# Patient Record
Sex: MALE | Race: WHITE | NOT HISPANIC OR LATINO | URBAN - METROPOLITAN AREA
[De-identification: names, ages, dates, MRNs, and addresses within clinical notes are randomized per-mention and may not be internally consistent; named-entity substitution may affect disease eponyms.]

---

## 2018-12-17 ENCOUNTER — TELEPHONE (OUTPATIENT)
Dept: UROLOGY | Facility: AMBULATORY SURGERY CENTER | Age: 83
End: 2018-12-17

## 2018-12-18 ENCOUNTER — OFFICE VISIT (OUTPATIENT)
Dept: UROLOGY | Facility: MEDICAL CENTER | Age: 83
End: 2018-12-18
Payer: MEDICARE

## 2018-12-18 VITALS
HEIGHT: 67 IN | BODY MASS INDEX: 24.48 KG/M2 | WEIGHT: 156 LBS | SYSTOLIC BLOOD PRESSURE: 102 MMHG | DIASTOLIC BLOOD PRESSURE: 60 MMHG

## 2018-12-18 DIAGNOSIS — N40.1 BENIGN PROSTATIC HYPERPLASIA WITH URINARY OBSTRUCTION: ICD-10-CM

## 2018-12-18 DIAGNOSIS — N13.8 BENIGN PROSTATIC HYPERPLASIA WITH URINARY OBSTRUCTION: ICD-10-CM

## 2018-12-18 DIAGNOSIS — R30.0 DYSURIA: Primary | ICD-10-CM

## 2018-12-18 PROBLEM — IMO0002: Status: ACTIVE | Noted: 2017-02-17

## 2018-12-18 PROBLEM — M17.0 PRIMARY OSTEOARTHRITIS OF BOTH KNEES: Status: ACTIVE | Noted: 2018-11-12

## 2018-12-18 PROBLEM — Z95.810 BIVENTRICULAR ICD (IMPLANTABLE CARDIOVERTER-DEFIBRILLATOR) IN PLACE: Status: ACTIVE | Noted: 2018-11-06

## 2018-12-18 PROBLEM — B37.0 THRUSH: Status: ACTIVE | Noted: 2018-02-20

## 2018-12-18 PROBLEM — K21.9 GASTROESOPHAGEAL REFLUX DISEASE WITHOUT ESOPHAGITIS: Status: ACTIVE | Noted: 2017-02-17

## 2018-12-18 LAB
POST-VOID RESIDUAL VOLUME, ML POC: 20 ML
SL AMB  POCT GLUCOSE, UA: ABNORMAL
SL AMB LEUKOCYTE ESTERASE,UA: ABNORMAL
SL AMB POCT BILIRUBIN,UA: ABNORMAL
SL AMB POCT BLOOD,UA: ABNORMAL
SL AMB POCT CLARITY,UA: ABNORMAL
SL AMB POCT COLOR,UA: ABNORMAL
SL AMB POCT KETONES,UA: ABNORMAL
SL AMB POCT NITRITE,UA: POSITIVE
SL AMB POCT PH,UA: 5.5
SL AMB POCT SPECIFIC GRAVITY,UA: 1.02
SL AMB POCT URINE PROTEIN: 100
SL AMB POCT UROBILINOGEN: 0.2

## 2018-12-18 PROCEDURE — 87086 URINE CULTURE/COLONY COUNT: CPT | Performed by: UROLOGY

## 2018-12-18 PROCEDURE — 81003 URINALYSIS AUTO W/O SCOPE: CPT | Performed by: UROLOGY

## 2018-12-18 PROCEDURE — 87077 CULTURE AEROBIC IDENTIFY: CPT | Performed by: UROLOGY

## 2018-12-18 PROCEDURE — 99214 OFFICE O/P EST MOD 30 MIN: CPT | Performed by: UROLOGY

## 2018-12-18 PROCEDURE — 87186 SC STD MICRODIL/AGAR DIL: CPT | Performed by: UROLOGY

## 2018-12-18 PROCEDURE — 51798 US URINE CAPACITY MEASURE: CPT | Performed by: UROLOGY

## 2018-12-18 RX ORDER — CIPROFLOXACIN 500 MG/1
500 TABLET, FILM COATED ORAL EVERY 12 HOURS SCHEDULED
Qty: 14 TABLET | Refills: 0 | Status: SHIPPED | OUTPATIENT
Start: 2018-12-18 | End: 2018-12-25

## 2018-12-18 RX ORDER — DIGOXIN 125 MCG
0.12 TABLET ORAL
COMMUNITY
Start: 2018-03-05

## 2018-12-18 RX ORDER — TEMAZEPAM 15 MG/1
15 CAPSULE ORAL DAILY PRN
COMMUNITY
Start: 2018-11-20

## 2018-12-18 RX ORDER — DOFETILIDE 0.25 MG/1
250 CAPSULE ORAL
COMMUNITY
Start: 2018-05-24

## 2018-12-18 RX ORDER — ALBUTEROL SULFATE 90 UG/1
AEROSOL, METERED RESPIRATORY (INHALATION)
COMMUNITY
Start: 2017-08-14

## 2018-12-18 RX ORDER — LISINOPRIL 5 MG/1
TABLET ORAL
COMMUNITY
Start: 2018-07-12

## 2018-12-18 RX ORDER — TORSEMIDE 20 MG/1
10 TABLET ORAL
COMMUNITY
Start: 2018-10-17

## 2018-12-18 RX ORDER — UBIDECARENONE 100 MG
100 CAPSULE ORAL
COMMUNITY

## 2018-12-18 RX ORDER — COD LIVER OIL
1 OIL (ML) ORAL
COMMUNITY

## 2018-12-18 RX ORDER — LANCETS 23 GAUGE
EACH MISCELLANEOUS
COMMUNITY
Start: 2017-03-15

## 2018-12-18 RX ORDER — ALLOPURINOL 100 MG/1
200 TABLET ORAL
COMMUNITY
Start: 2018-09-04 | End: 2019-09-04

## 2018-12-18 RX ORDER — ALBUTEROL SULFATE 2.5 MG/3ML
2.5 SOLUTION RESPIRATORY (INHALATION)
COMMUNITY
Start: 2017-12-12

## 2018-12-18 RX ORDER — BISOPROLOL FUMARATE 5 MG/1
5 TABLET ORAL
COMMUNITY
Start: 2018-04-11

## 2018-12-18 NOTE — ASSESSMENT & PLAN NOTE
His exam is benign  The recent exacerbation of symptoms is likely due to urinary tract infection  We will reassess his voiding pattern after treatment of his urinary infection  He will return in 6 weeks

## 2018-12-18 NOTE — ASSESSMENT & PLAN NOTE
Urinalysis is consistent with urinary tract infection  We will send urine for culture and treat empirically with Cipro 500 m tablet p o  B i d  X1 week  Postvoid residual is acceptable at 20 cc

## 2018-12-18 NOTE — PROGRESS NOTES
Assessment/Plan:    Dysuria  Urinalysis is consistent with urinary tract infection  We will send urine for culture and treat empirically with Cipro 500 m tablet p o  B i d  X1 week  Postvoid residual is acceptable at 20 cc  Benign prostatic hyperplasia with urinary obstruction  His exam is benign  The recent exacerbation of symptoms is likely due to urinary tract infection  We will reassess his voiding pattern after treatment of his urinary infection  He will return in 6 weeks  Diagnoses and all orders for this visit:    Dysuria  -     POCT urine dip auto non-scope  -     Urine culture  -     ciprofloxacin (CIPRO) 500 mg tablet; Take 1 tablet (500 mg total) by mouth every 12 (twelve) hours for 7 days  -     POCT Measure PVR    Benign prostatic hyperplasia with urinary obstruction    Other orders  -     albuterol (2 5 mg/3 mL) 0 083 % nebulizer solution; Inhale 2 5 mg  -     allopurinol (ZYLOPRIM) 100 mg tablet; Take 200 mg by mouth  -     apixaban (ELIQUIS) 2 5 mg; Take 2 5 mg by mouth  -     bisoprolol (ZEBETA) 5 mg tablet; Take 5 mg by mouth  -     Cholecalciferol 41337 units TABS; Take by mouth  -     Cod Liver Oil 5000-500 UNIT/5ML OIL; Take 1 tablet by mouth  -     co-enzyme Q-10 100 mg capsule; Take 100 mg by mouth  -     digoxin (LANOXIN) 0 125 mg tablet; Take 0 125 mg by mouth  -     dofetilide (TIKOSYN) 250 mcg capsule; Take 250 mcg by mouth  -     fluticasone-salmeterol (ADVAIR DISKUS) 250-50 mcg/dose inhaler; Inhale 1 puff  -     Lancets 28G MISC; USE TO TEST BLOOD GLUCOSE THREE TIMES DAILY  -     glucose blood test strip; Test blood sugars three times daily  DX:D08 65, E08 22  -     lisinopril (ZESTRIL) 5 mg tablet; TAKE ONE-HALF TABLET BY MOUTH EVERY DAY  -     temazepam (RESTORIL) 15 mg capsule; Take 15 mg by mouth daily as needed  -     torsemide (DEMADEX) 20 mg tablet;  Take 10 mg by mouth    -     albuterol (VENTOLIN HFA) 90 mcg/act inhaler; USE 1 PUFF BY MOUTH EVERY 6 HOUR AS NEEDED FOR WHEEZING        AUA SYMPTOM SCORE      Most Recent Value   AUA SYMPTOM SCORE   How often have you had a sensation of not emptying your bladder completely after you finished urinating? 5   How often have you had to urinate again less than two hours after you finished urinating? 5   How often have you found you stopped and started again several times when you urinate? 5   How often have you found it difficult to postpone urination? 0   How often have you had a weak urinary stream?  0   How often have you had to push or strain to begin urination? 0   How many times did you most typically get up to urinate from the time you went to bed at night until the time you got up in the morning? 5   Quality of Life: If you were to spend the rest of your life with your urinary condition just the way it is now, how would you feel about that?  6   AUA SYMPTOM SCORE  20        Subjective:      Patient ID: Glynn Pereira is a 80 y o  male  CC:  Dysuria and urinary frequency    HPI:  19-year-old male with multiple medical problems who notes a long history of lower tract symptoms  He was recently hospitalized and his diuretic was adjusted  He began to note he was voiding more frequently  Approximately 10 days ago he noted the onset of worsening urinary frequency,  and dysuria  He is getting up approximately 5 times at night to urinate  His symptoms have continued he is not happy with his voiding pattern at this time  There is no gross hematuria  He has no flank pain  He denies fever  He is unsure if he is emptying his bladder  The following portions of the patient's history were reviewed and updated as appropriate: allergies, current medications, past family history, past medical history, past social history, past surgical history and problem list     Review of Systems   Constitutional: Negative for chills, diaphoresis, fatigue and fever  HENT: Negative  Eyes: Negative  Respiratory: Negative  Cardiovascular: Negative  Gastrointestinal: Negative  Endocrine: Negative  Genitourinary:        See HPI   Musculoskeletal: Positive for neck pain  Skin: Negative  Allergic/Immunologic: Negative  Neurological: Negative  Hematological: Negative  Psychiatric/Behavioral: Negative  Objective:      /60 (BP Location: Left arm, Patient Position: Sitting)   Ht 5' 7" (1 702 m)   Wt 70 8 kg (156 lb)   BMI 24 43 kg/m²          Physical Exam   Constitutional: He is oriented to person, place, and time  He appears well-developed and well-nourished  HENT:   Head: Normocephalic and atraumatic  Eyes: Conjunctivae are normal    Neck: Neck supple  Cardiovascular: Normal rate  Pulmonary/Chest: Effort normal    Abdominal: Soft  Bowel sounds are normal  He exhibits no distension and no mass  There is no tenderness  There is no rebound, no guarding and no CVA tenderness  Right inguinal hernia   Genitourinary: Rectum normal, testes normal and penis normal  Right testis shows no mass  Left testis shows no mass  No phimosis or hypospadias  Genitourinary Comments: Testes descended and nontender  Prostate moderately enlarged and palpably benign, nontender   Musculoskeletal: He exhibits no edema  No CVA tenderness   Neurological: He is alert and oriented to person, place, and time  Skin: Skin is warm and dry  Psychiatric: He has a normal mood and affect  His behavior is normal  Judgment and thought content normal    Vitals reviewed

## 2018-12-18 NOTE — LETTER
2018     Shorty Hanna MD  [address]  [address]    Patient: Lela Serrato   YOB: 1933   Date of Visit: 2018       Dear Dr Nilsa Rapp:    Thank you for referring Lela Serrato to me for evaluation  Below are my notes for this consultation  If you have questions, please do not hesitate to call me  I look forward to following your patient along with you  Sincerely,        Nadira Reyes MD        CC: No Recipients  Nadira Reyes MD  2018  3:06 PM  Sign at close encounter  Assessment/Plan:    Dysuria  Urinalysis is consistent with urinary tract infection  We will send urine for culture and treat empirically with Cipro 500 m tablet p o  B i d  X1 week  Postvoid residual is acceptable at 20 cc  Benign prostatic hyperplasia with urinary obstruction  His exam is benign  The recent exacerbation of symptoms is likely due to urinary tract infection  We will reassess his voiding pattern after treatment of his urinary infection  He will return in 6 weeks  Diagnoses and all orders for this visit:    Dysuria  -     POCT urine dip auto non-scope  -     Urine culture  -     ciprofloxacin (CIPRO) 500 mg tablet; Take 1 tablet (500 mg total) by mouth every 12 (twelve) hours for 7 days  -     POCT Measure PVR    Benign prostatic hyperplasia with urinary obstruction    Other orders  -     albuterol (2 5 mg/3 mL) 0 083 % nebulizer solution; Inhale 2 5 mg  -     allopurinol (ZYLOPRIM) 100 mg tablet; Take 200 mg by mouth  -     apixaban (ELIQUIS) 2 5 mg; Take 2 5 mg by mouth  -     bisoprolol (ZEBETA) 5 mg tablet; Take 5 mg by mouth  -     Cholecalciferol 00609 units TABS; Take by mouth  -     Cod Liver Oil 5000-500 UNIT/5ML OIL; Take 1 tablet by mouth  -     co-enzyme Q-10 100 mg capsule; Take 100 mg by mouth  -     digoxin (LANOXIN) 0 125 mg tablet; Take 0 125 mg by mouth  -     dofetilide (TIKOSYN) 250 mcg capsule;  Take 250 mcg by mouth  -     fluticasone-salmeterol (ADVAIR DISKUS) 250-50 mcg/dose inhaler; Inhale 1 puff  -     Lancets 28G MISC; USE TO TEST BLOOD GLUCOSE THREE TIMES DAILY  -     glucose blood test strip; Test blood sugars three times daily  DX:D08 65, E08 22  -     lisinopril (ZESTRIL) 5 mg tablet; TAKE ONE-HALF TABLET BY MOUTH EVERY DAY  -     temazepam (RESTORIL) 15 mg capsule; Take 15 mg by mouth daily as needed  -     torsemide (DEMADEX) 20 mg tablet; Take 10 mg by mouth    -     albuterol (VENTOLIN HFA) 90 mcg/act inhaler; USE 1 PUFF BY MOUTH EVERY 6 HOUR AS NEEDED FOR WHEEZING        AUA SYMPTOM SCORE      Most Recent Value   AUA SYMPTOM SCORE   How often have you had a sensation of not emptying your bladder completely after you finished urinating? 5   How often have you had to urinate again less than two hours after you finished urinating? 5   How often have you found you stopped and started again several times when you urinate? 5   How often have you found it difficult to postpone urination? 0   How often have you had a weak urinary stream?  0   How often have you had to push or strain to begin urination? 0   How many times did you most typically get up to urinate from the time you went to bed at night until the time you got up in the morning? 5   Quality of Life: If you were to spend the rest of your life with your urinary condition just the way it is now, how would you feel about that?  6   AUA SYMPTOM SCORE  20        Subjective:      Patient ID: Carla Carrillo is a 80 y o  male  CC:  Dysuria and urinary frequency    HPI:  79-year-old male with multiple medical problems who notes a long history of lower tract symptoms  He was recently hospitalized and his diuretic was adjusted  He began to note he was voiding more frequently  Approximately 10 days ago he noted the onset of worsening urinary frequency,  and dysuria  He is getting up approximately 5 times at night to urinate    His symptoms have continued he is not happy with his voiding pattern at this time  There is no gross hematuria  He has no flank pain  He denies fever  He is unsure if he is emptying his bladder  The following portions of the patient's history were reviewed and updated as appropriate: allergies, current medications, past family history, past medical history, past social history, past surgical history and problem list     Review of Systems   Constitutional: Negative for chills, diaphoresis, fatigue and fever  HENT: Negative  Eyes: Negative  Respiratory: Negative  Cardiovascular: Negative  Gastrointestinal: Negative  Endocrine: Negative  Genitourinary:        See HPI   Musculoskeletal: Positive for neck pain  Skin: Negative  Allergic/Immunologic: Negative  Neurological: Negative  Hematological: Negative  Psychiatric/Behavioral: Negative  Objective:      /60 (BP Location: Left arm, Patient Position: Sitting)   Ht 5' 7" (1 702 m)   Wt 70 8 kg (156 lb)   BMI 24 43 kg/m²           Physical Exam   Constitutional: He is oriented to person, place, and time  He appears well-developed and well-nourished  HENT:   Head: Normocephalic and atraumatic  Eyes: Conjunctivae are normal    Neck: Neck supple  Cardiovascular: Normal rate  Pulmonary/Chest: Effort normal    Abdominal: Soft  Bowel sounds are normal  He exhibits no distension and no mass  There is no tenderness  There is no rebound, no guarding and no CVA tenderness  Right inguinal hernia   Genitourinary: Rectum normal, testes normal and penis normal  Right testis shows no mass  Left testis shows no mass  No phimosis or hypospadias  Genitourinary Comments: Testes descended and nontender  Prostate moderately enlarged and palpably benign, nontender   Musculoskeletal: He exhibits no edema  No CVA tenderness   Neurological: He is alert and oriented to person, place, and time  Skin: Skin is warm and dry     Psychiatric: He has a normal mood and affect  His behavior is normal  Judgment and thought content normal    Vitals reviewed

## 2018-12-18 NOTE — PATIENT INSTRUCTIONS
Ciprofloxacin (By mouth)   Ciprofloxacin ([id number])  Treats infections and plague  This medicine is a quinolone antibiotic  Brand Name(s): Cipro   There may be other brand names for this medicine  When This Medicine Should Not Be Used: This medicine is not right for everyone  Do not use it if you had an allergic reaction to ciprofloxacin or to similar medicines  How to Use This Medicine:   Liquid, Tablet, Long Acting Tablet  · Your doctor will tell you how much medicine to use  Do not use more than directed  Take this medicine at the same time each day  · You may take this medicine with or without food  Do not take this medicine with only a source of calcium, such as milk, yogurt, or juice that contains added calcium  You may have foods or drinks that contain calcium as part of a larger meal   · Swallow the extended-release tablet whole  Do not crush, break, or chew it  · Oral liquid: Shake for at least 15 seconds just before each use  The liquid has small beads floating in it  Do not chew the beads when you drink the liquid  Measure the oral liquid medicine with a marked measuring spoon, oral syringe, or medicine cup  · Tablet: Swallow whole  Do not break, crush, or chew it  · Drink extra fluids so you will urinate more often and help prevent kidney problems  · Take all of the medicine in your prescription to clear up your infection, even if you feel better after the first few doses  · This medicine should come with a Medication Guide  Ask your pharmacist for a copy if you do not have one  · Missed dose: Take a dose as soon as you remember  If it is almost time for your next dose, wait until then and take a regular dose  Do not take extra medicine to make up for a missed dose  · Store the medicine in a closed container at room temperature, away from heat, moisture, and direct light  Throw away any leftover liquid medicine after 14 days    Drugs and Foods to Avoid:   Ask your doctor or pharmacist before using any other medicine, including over-the-counter medicines, vitamins, and herbal products  · Do not use this medicine together with tizanidine  · Some foods and medicines can affect how ciprofloxacin works  Tell your doctor if you are using any of the following:  ¨ Clozapine, cyclosporine, duloxetine, lidocaine, methotrexate, olanzapine, pentoxifylline, phenytoin, probenecid, ropinirole, sildenafil, theophylline  ¨ Antibiotic (including azithromycin, clarithromycin, erythromycin)  ¨ Blood thinner (including warfarin)  ¨ Diabetes medicine (including glimepiride, glyburide)  ¨ Medicine for depression or mental illness  ¨ Medicine for heart rhythm problems (including amiodarone, procainamide, quinidine, sotalol)  ¨ NSAID pain medicine (including aspirin, celecoxib, diclofenac, ibuprofen, naproxen)  ¨ Steroid medicine (including hydrocortisone, methylprednisolone, prednisone)  · Take ciprofloxacin at least 2 hours before or 6 hours after you take antacids containing aluminum or magnesium, calcium, zinc, iron, lanthanum, sevelamer, sucralfate, and didanosine  This includes vitamin/mineral supplements  · This medicine slows the digestion of caffeine, so it might affect you for longer than normal   Warnings While Using This Medicine:   · Tell your doctor if you are pregnant or breastfeeding, or if you have kidney disease, liver disease, diabetes, heart disease, myasthenia gravis, or a history of heart rhythm problems (such as prolonged QT interval) or seizures  Tell your doctor if you have ever had tendon or joint problems, including rheumatoid arthritis, or if you have received a transplant  · This medicine may cause the following problems:  ¨ Tendinitis and tendon rupture (may happen after treatment ends)  ¨ Liver damage  ¨ Nerve damage in the arms or legs  ¨ Heart rhythm changes  ¨ Changes in blood sugar levels  · This medicine may make you dizzy, drowsy, or lightheaded   Do not drive or do anything that could be dangerous until you know how this medicine affects you  · This medicine can cause diarrhea  Call your doctor if the diarrhea becomes severe, does not stop, or is bloody  Do not take any medicine to stop diarrhea until you have talked to your doctor  Diarrhea can occur 2 months or more after you stop taking this medicine  · This medicine may make your skin more sensitive to sunlight  Wear sunscreen  Do not use sunlamps or tanning beds  · Call your doctor if your symptoms do not improve or if they get worse  · Keep all medicine out of the reach of children  Never share your medicine with anyone  Possible Side Effects While Using This Medicine:   Call your doctor right away if you notice any of these side effects:  · Allergic reaction: Itching or hives, swelling in your face or hands, swelling or tingling in your mouth or throat, chest tightness, trouble breathing  · Blistering, peeling, red skin rash  · Dark-colored urine or pale stools, nausea, vomiting, loss of appetite, pain in your upper stomach, yellow skin or eyes  · Diarrhea that may contain blood  · Fainting, dizziness, or lightheadedness  · Fast, slow, or uneven heartbeat  · Numbness, tingling, weakness, or burning pain in your hands, arms, legs, or feet  · Pain, stiffness, swelling, or bruises around your ankle, leg, shoulder, or other joint  · Seizures, severe headache, unusual thoughts or behaviors, trouble sleeping, feeling anxious, confused, or depressed, seeing, hearing, or feeling things that are not there  · Unusual bleeding, bruising, or weakness  If you notice other side effects that you think are caused by this medicine, tell your doctor  Call your doctor for medical advice about side effects  You may report side effects to FDA at 1-596-FDA-5340  © 2017 [employer] Information is for End User's use only and may not be sold, redistributed or otherwise used for commercial purposes    The above information is an  only  It is not intended as medical advice for individual conditions or treatments  Talk to your doctor, nurse or pharmacist before following any medical regimen to see if it is safe and effective for you

## 2018-12-20 LAB — BACTERIA UR CULT: ABNORMAL

## 2018-12-26 ENCOUNTER — TELEPHONE (OUTPATIENT)
Dept: UROLOGY | Facility: MEDICAL CENTER | Age: 83
End: 2018-12-26

## 2018-12-26 NOTE — TELEPHONE ENCOUNTER
----- Message from Kimmie Shetty MD sent at 12/24/2018 12:39 PM EST -----  Please call the patient regarding his abnormal result  Urine culture was positive  Cipro prescribed should be effective

## 2018-12-27 NOTE — TELEPHONE ENCOUNTER
Patient returning your call  He said he is feeling pretty good that he has no more urgency and burning   No need for call

## 2019-01-15 ENCOUNTER — OFFICE VISIT (OUTPATIENT)
Dept: UROLOGY | Facility: MEDICAL CENTER | Age: 84
End: 2019-01-15
Payer: MEDICARE

## 2019-01-15 VITALS
DIASTOLIC BLOOD PRESSURE: 68 MMHG | HEART RATE: 60 BPM | SYSTOLIC BLOOD PRESSURE: 124 MMHG | HEIGHT: 67 IN | BODY MASS INDEX: 24.55 KG/M2 | WEIGHT: 156.4 LBS

## 2019-01-15 DIAGNOSIS — N40.1 BPH ASSOCIATED WITH NOCTURIA: Primary | ICD-10-CM

## 2019-01-15 DIAGNOSIS — R35.1 BPH ASSOCIATED WITH NOCTURIA: Primary | ICD-10-CM

## 2019-01-15 PROCEDURE — 99214 OFFICE O/P EST MOD 30 MIN: CPT | Performed by: NURSE PRACTITIONER

## 2019-01-15 RX ORDER — TAMSULOSIN HYDROCHLORIDE 0.4 MG/1
0.4 CAPSULE ORAL
Qty: 30 CAPSULE | Refills: 1 | Status: SHIPPED | OUTPATIENT
Start: 2019-01-15 | End: 2019-03-12 | Stop reason: SDUPTHER

## 2019-01-15 NOTE — LETTER
January 15, 2019     Herminia Medrano MD  [address]  [id number]    Patient: Shona White   YOB: 1933   Date of Visit: 1/15/2019       Dear Dr Andree Vicente:    Thank you for referring Shona White to me for evaluation  Below are my notes for this consultation  If you have questions, please do not hesitate to call me  I look forward to following your patient along with you  Sincerely,        KEREN Sunshine        CC: No Recipients  Ching Whitaker, [address]  1/15/2019  7:55 PM  Sign at close encounter  1/15/2019      Chief Complaint   Patient presents with    Benign Prostatic Hypertrophy     4 week f/u    Difficulty Urinating       Assessment and Plan    80 y o  male managed by Dr Shay Nest  1  Benign prostatic hyperplasia  · Patient reports urinary hesitancy, urinary frequency and urgency despite resolution of urinary tract infection  · Patient reports rare,  occasional episodes of incontinence  · After discussion, decided to trial a course of tamsulosin 0 4 mg p o  Daily for the next 6 weeks and return for re-evaluation of symptoms  · Discussed behavior modifications to assist in urinary frequency and urgency  Such as double voiding, ensuring adequate fluid hydration, cessation of fluid intake prior to bed to decrease episodes of nocturia  · Patient agreeable to this plan  2  Dysuria  · Secondary to urinary tract infection  · Resolved  · Patient complete a course of ciprofloxacin 500 mg p o  B i d x 1 week  And has been asymptomatic since the resolution of the urinary tract infection  History of Present Illness  Shona White is a 80 y o  male here for follow up evaluation of  benign prostatic hyperplasia and status of dysuria  Patient was treated with ciprofloxacin 500 mg p o  B i d  X1 week upon completion of antibiotic therapy his urinary tract symptoms have resolved and has been asymptomatic since     Patient also has a history of benign prostatic hyperplasia with the complaints of urinary frequency, hesitancy and urgency  He does report aware episode of incontinence but is not bothered by this at this time  Of note, patient is on torsemide 20 mg daily which she feels contributes to his urinary symptoms  Patient denies hematuria, dysuria, suprapubic and flank pain  Review of Systems   Constitutional: Negative for activity change, chills and fever  Respiratory: Negative for cough and shortness of breath  Cardiovascular: Negative for chest pain  Gastrointestinal: Negative for abdominal pain and constipation  Genitourinary: Positive for difficulty urinating, frequency and urgency  Negative for dysuria, enuresis, flank pain and hematuria  All other systems reviewed and are negative  Past Medical History  Past Medical History:   Diagnosis Date    BPH with obstruction/lower urinary tract symptoms     Elevated PSA     Hypercholesteremia     Hypertension     Paroxysmal ventricular tachycardia (HCC)     Reflux esophagitis     Skin cancer     UTI (urinary tract infection)        Past Social History  Past Surgical History:   Procedure Laterality Date    CARDIAC DEFIBRILLATOR PLACEMENT      CARDIAC PACEMAKER PLACEMENT  11/2018    CATARACT EXTRACTION      CHOLECYSTECTOMY       History   Smoking Status    Former Smoker   Smokeless Tobacco    Never Used       Past Family History  Family History   Problem Relation Age of Onset    Heart attack Mother     Brain cancer Father     Stroke Sister     Colon cancer Brother        Past Social history  Social History     Social History    Marital status: /Civil Union     Spouse name: N/A    Number of children: N/A    Years of education: N/A     Occupational History    Not on file       Social History Main Topics    Smoking status: Former Smoker    Smokeless tobacco: Never Used    Alcohol use No    Drug use: No    Sexual activity: Not on file     Other Topics Concern  Not on file     Social History Narrative    No narrative on file       Current Medications  Current Outpatient Prescriptions   Medication Sig Dispense Refill    albuterol (2 5 mg/3 mL) 0 083 % nebulizer solution Inhale 2 5 mg      albuterol (VENTOLIN HFA) 90 mcg/act inhaler USE 1 PUFF BY MOUTH EVERY 6 HOUR AS NEEDED FOR WHEEZING      allopurinol (ZYLOPRIM) 100 mg tablet Take 200 mg by mouth      apixaban (ELIQUIS) 2 5 mg Take 2 5 mg by mouth      bisoprolol (ZEBETA) 5 mg tablet Take 5 mg by mouth      Cholecalciferol 55671 units TABS Take by mouth      co-enzyme Q-10 100 mg capsule Take 100 mg by mouth      Cod Liver Oil 5000-500 UNIT/5ML OIL Take 1 tablet by mouth      digoxin (LANOXIN) 0 125 mg tablet Take 0 125 mg by mouth      dofetilide (TIKOSYN) 250 mcg capsule Take 250 mcg by mouth      fluticasone-salmeterol (ADVAIR DISKUS) 250-50 mcg/dose inhaler Inhale 1 puff      glucose blood test strip Test blood sugars three times daily  DX:D08 65, N38 09      Lancets 28G MISC USE TO TEST BLOOD GLUCOSE THREE TIMES DAILY      lisinopril (ZESTRIL) 5 mg tablet TAKE ONE-HALF TABLET BY MOUTH EVERY DAY      temazepam (RESTORIL) 15 mg capsule Take 15 mg by mouth daily as needed      torsemide (DEMADEX) 20 mg tablet Take 10 mg by mouth        tamsulosin (FLOMAX) 0 4 mg Take 1 capsule (0 4 mg total) by mouth daily with dinner 30 capsule 1     No current facility-administered medications for this visit          Allergies  Allergies   Allergen Reactions    Valproic Acid Hives    Cephalexin Diarrhea    Linagliptin     Penicillins Other (See Comments)     hives    Sacubitril-Valsartan     Metoprolol Rash       The following portions of the patient's history were reviewed and updated as appropriate: allergies, current medications, past medical history, past social history, past surgical history and problem list       Vitals  Vitals:    01/15/19 1402   BP: 124/68   BP Location: Left arm   Patient Position: Sitting   Cuff Size: Adult   Pulse: 60   Weight: 70 9 kg (156 lb 6 4 oz)   Height: 5' 7" (1 702 m)       Physical Exam  /68 (BP Location: Left arm, Patient Position: Sitting, Cuff Size: Adult)   Pulse 60   Ht 5' 7" (1 702 m)   Wt 70 9 kg (156 lb 6 4 oz)   BMI 24 50 kg/m²    General appearance: alert and oriented, in no acute distress  Eyes: negative  Neck: supple, symmetrical, trachea midline  Lungs: clear to auscultation bilaterally  Heart: regular rate and rhythm  Abdomen: Soft, nontender  Nondistended  Skin: Warm dry and intact  Neurologic: Alert and oriented X 3, normal strength and tone  Normal symmetric reflexes  Normal coordination and gait    Results  No results found for this or any previous visit (from the past 1 hour(s))  ]  No results found for: PSA  No results found for: GLUCOSE, CALCIUM, NA, K, CO2, CL, BUN, CREATININE  No results found for: WBC, HGB, HCT, MCV, PLT      Orders  No orders of the defined types were placed in this encounter      KEREN Lin

## 2019-01-15 NOTE — PROGRESS NOTES
1/15/2019      Chief Complaint   Patient presents with    Benign Prostatic Hypertrophy     4 week f/u    Difficulty Urinating       Assessment and Plan    80 y o  male managed by Dr Jessica Richard  1  Benign prostatic hyperplasia  · Patient reports urinary hesitancy, urinary frequency and urgency despite resolution of urinary tract infection  · Patient reports rare,  occasional episodes of incontinence  · After discussion, decided to trial a course of tamsulosin 0 4 mg p o  Daily for the next 6 weeks and return for re-evaluation of symptoms  · Discussed behavior modifications to assist in urinary frequency and urgency  Such as double voiding, ensuring adequate fluid hydration, cessation of fluid intake prior to bed to decrease episodes of nocturia  · Patient agreeable to this plan  2  Dysuria  · Secondary to urinary tract infection  · Resolved  · Patient complete a course of ciprofloxacin 500 mg p o  B i d x 1 week  And has been asymptomatic since the resolution of the urinary tract infection  History of Present Illness  Caleb Hopper is a 80 y o  male here for follow up evaluation of  benign prostatic hyperplasia and status of dysuria  Patient was treated with ciprofloxacin 500 mg p o  B i d  X1 week upon completion of antibiotic therapy his urinary tract symptoms have resolved and has been asymptomatic since     Patient also has a history of benign prostatic hyperplasia with the complaints of urinary frequency, hesitancy and urgency  He does report aware episode of incontinence but is not bothered by this at this time  Of note, patient is on torsemide 20 mg daily which she feels contributes to his urinary symptoms  Patient denies hematuria, dysuria, suprapubic and flank pain  Review of Systems   Constitutional: Negative for activity change, chills and fever  Respiratory: Negative for cough and shortness of breath  Cardiovascular: Negative for chest pain     Gastrointestinal: Negative for abdominal pain and constipation  Genitourinary: Positive for difficulty urinating, frequency and urgency  Negative for dysuria, enuresis, flank pain and hematuria  All other systems reviewed and are negative  Past Medical History  Past Medical History:   Diagnosis Date    BPH with obstruction/lower urinary tract symptoms     Elevated PSA     Hypercholesteremia     Hypertension     Paroxysmal ventricular tachycardia (HCC)     Reflux esophagitis     Skin cancer     UTI (urinary tract infection)        Past Social History  Past Surgical History:   Procedure Laterality Date    CARDIAC DEFIBRILLATOR PLACEMENT      CARDIAC PACEMAKER PLACEMENT  11/2018    CATARACT EXTRACTION      CHOLECYSTECTOMY       History   Smoking Status    Former Smoker   Smokeless Tobacco    Never Used       Past Family History  Family History   Problem Relation Age of Onset    Heart attack Mother     Brain cancer Father     Stroke Sister     Colon cancer Brother        Past Social history  Social History     Social History    Marital status: /Civil Union     Spouse name: N/A    Number of children: N/A    Years of education: N/A     Occupational History    Not on file       Social History Main Topics    Smoking status: Former Smoker    Smokeless tobacco: Never Used    Alcohol use No    Drug use: No    Sexual activity: Not on file     Other Topics Concern    Not on file     Social History Narrative    No narrative on file       Current Medications  Current Outpatient Prescriptions   Medication Sig Dispense Refill    albuterol (2 5 mg/3 mL) 0 083 % nebulizer solution Inhale 2 5 mg      albuterol (VENTOLIN HFA) 90 mcg/act inhaler USE 1 PUFF BY MOUTH EVERY 6 HOUR AS NEEDED FOR WHEEZING      allopurinol (ZYLOPRIM) 100 mg tablet Take 200 mg by mouth      apixaban (ELIQUIS) 2 5 mg Take 2 5 mg by mouth      bisoprolol (ZEBETA) 5 mg tablet Take 5 mg by mouth      Cholecalciferol 74961 units TABS Take by mouth  co-enzyme Q-10 100 mg capsule Take 100 mg by mouth      Cod Liver Oil 5000-500 UNIT/5ML OIL Take 1 tablet by mouth      digoxin (LANOXIN) 0 125 mg tablet Take 0 125 mg by mouth      dofetilide (TIKOSYN) 250 mcg capsule Take 250 mcg by mouth      fluticasone-salmeterol (ADVAIR DISKUS) 250-50 mcg/dose inhaler Inhale 1 puff      glucose blood test strip Test blood sugars three times daily  DX:D08 65, A51 86      Lancets 28G MISC USE TO TEST BLOOD GLUCOSE THREE TIMES DAILY      lisinopril (ZESTRIL) 5 mg tablet TAKE ONE-HALF TABLET BY MOUTH EVERY DAY      temazepam (RESTORIL) 15 mg capsule Take 15 mg by mouth daily as needed      torsemide (DEMADEX) 20 mg tablet Take 10 mg by mouth        tamsulosin (FLOMAX) 0 4 mg Take 1 capsule (0 4 mg total) by mouth daily with dinner 30 capsule 1     No current facility-administered medications for this visit  Allergies  Allergies   Allergen Reactions    Valproic Acid Hives    Cephalexin Diarrhea    Linagliptin     Penicillins Other (See Comments)     hives    Sacubitril-Valsartan     Metoprolol Rash       The following portions of the patient's history were reviewed and updated as appropriate: allergies, current medications, past medical history, past social history, past surgical history and problem list       Vitals  Vitals:    01/15/19 1402   BP: 124/68   BP Location: Left arm   Patient Position: Sitting   Cuff Size: Adult   Pulse: 60   Weight: 70 9 kg (156 lb 6 4 oz)   Height: 5' 7" (1 702 m)       Physical Exam  /68 (BP Location: Left arm, Patient Position: Sitting, Cuff Size: Adult)   Pulse 60   Ht 5' 7" (1 702 m)   Wt 70 9 kg (156 lb 6 4 oz)   BMI 24 50 kg/m²   General appearance: alert and oriented, in no acute distress  Eyes: negative  Neck: supple, symmetrical, trachea midline  Lungs: clear to auscultation bilaterally  Heart: regular rate and rhythm  Abdomen: Soft, nontender  Nondistended  Skin: Warm dry and intact    Neurologic: Alert and oriented X 3, normal strength and tone  Normal symmetric reflexes  Normal coordination and gait    Results  No results found for this or any previous visit (from the past 1 hour(s))  ]  No results found for: PSA  No results found for: GLUCOSE, CALCIUM, NA, K, CO2, CL, BUN, CREATININE  No results found for: WBC, HGB, HCT, MCV, PLT      Orders  No orders of the defined types were placed in this encounter      KEREN Chowdhury

## 2019-03-12 DIAGNOSIS — R35.1 BPH ASSOCIATED WITH NOCTURIA: ICD-10-CM

## 2019-03-12 DIAGNOSIS — N40.1 BPH ASSOCIATED WITH NOCTURIA: ICD-10-CM

## 2019-03-12 RX ORDER — TAMSULOSIN HYDROCHLORIDE 0.4 MG/1
CAPSULE ORAL
Qty: 30 CAPSULE | Refills: 1 | Status: SHIPPED | OUTPATIENT
Start: 2019-03-12